# Patient Record
Sex: FEMALE | Race: WHITE | Employment: UNEMPLOYED | URBAN - NONMETROPOLITAN AREA
[De-identification: names, ages, dates, MRNs, and addresses within clinical notes are randomized per-mention and may not be internally consistent; named-entity substitution may affect disease eponyms.]

---

## 2023-09-11 ENCOUNTER — TELEPHONE (OUTPATIENT)
Dept: CARDIOLOGY CLINIC | Age: 53
End: 2023-09-11

## 2023-09-11 NOTE — TELEPHONE ENCOUNTER
Patient called stating she was in [facility] ER yesterday due to feeling flushed and having chest pain. They advised her to stop Metoprolol due to HR in 50s. She states she feels a lot better today. BP since then have been 146/66m HR-67 and -76 and HR 72. She is asking if she should stay off her Metoprolol. I called for records from [facility]. She also states her last name changed to Isabel and updated information in chart. Dr. Mary Mccann advise on Metoprolol.

## 2023-09-12 NOTE — TELEPHONE ENCOUNTER
Pt calling again-states she has been feeling much better off the Metoprolol. No more HA, chest pain or neck pain.   BP readings today  117/58 78  112/74 75  123/94 67

## 2023-09-19 RX ORDER — ATORVASTATIN CALCIUM 40 MG/1
40 TABLET, FILM COATED ORAL DAILY
Qty: 60 TABLET | Refills: 0 | Status: SHIPPED | OUTPATIENT
Start: 2023-09-19

## 2023-10-12 ENCOUNTER — OFFICE VISIT (OUTPATIENT)
Dept: CARDIOLOGY CLINIC | Age: 53
End: 2023-10-12
Payer: COMMERCIAL

## 2023-10-12 VITALS
HEIGHT: 68 IN | DIASTOLIC BLOOD PRESSURE: 81 MMHG | WEIGHT: 236 LBS | SYSTOLIC BLOOD PRESSURE: 130 MMHG | BODY MASS INDEX: 35.77 KG/M2 | HEART RATE: 73 BPM

## 2023-10-12 DIAGNOSIS — I25.10 CORONARY ARTERY DISEASE INVOLVING NATIVE CORONARY ARTERY OF NATIVE HEART WITHOUT ANGINA PECTORIS: Primary | ICD-10-CM

## 2023-10-12 PROCEDURE — 93000 ELECTROCARDIOGRAM COMPLETE: CPT | Performed by: INTERNAL MEDICINE

## 2023-10-12 PROCEDURE — 99214 OFFICE O/P EST MOD 30 MIN: CPT | Performed by: INTERNAL MEDICINE

## 2023-10-12 NOTE — PROGRESS NOTES
Pt here for 1 yr check up -has upcoming kidney mass removal with Dr. Adán Cortez  TBD       Pt lost father recently     Pt states was recently at [facility]        Pt states no c/o
AM       No results found for: \"TSH\"      Testing Reviewed:      I haveindividually reviewed the below cardiac tests    EKG:    ECHO: Results for orders placed in visit on 03/29/21    ECHO Complete 2D W Doppler W Color      STRESS:    CATH:    Assessment/Plan       Diagnosis Orders   1. Coronary artery disease involving native coronary artery of native heart without angina pectoris  EKG 12 lead        CAD s/p PCI  Hx NSTEMI s/p PCI 5/2/2018  Fibromyalgia  Obesity     EKG shows SR, no ST-T changes  BP well controlled  Continue Aspirin,  statin,   On Plavix. Stopped metoprolol for \"brain foggyness\" which has improved since stopping  May need surgery to remove renal mass  The patient is asked to make an attempt to improve diet and exercise patterns to aid in medical management of this problem. Advised more plant based nutrition/meditarrean diet   Advised patient to call office or seek immediate medical attention if there is any new onset of  any chest pain, sob, palpitations, lightheadedness, dizziness, orthopnea, PND or pedal edema. All medication side effects were discussed in details. Thank youfor allowing me to participate in the care of this patient. Please do not hesitate to contact me for any further questions. Return in about 1 year (around 10/12/2024), or if symptoms worsen or fail to improve, for Review testing, Regular follow up.        Electronically signed by Mandy Bolanos MD [facility]  10/12/2023 at 4:03 PM EDT

## 2023-10-16 ENCOUNTER — HOSPITAL ENCOUNTER (OUTPATIENT)
Dept: MRI IMAGING | Age: 53
Discharge: HOME | End: 2023-10-16
Attending: RADIOLOGY

## 2023-10-16 ENCOUNTER — HOSPITAL ENCOUNTER (OUTPATIENT)
Dept: CT IMAGING | Age: 53
Discharge: HOME | End: 2023-10-16
Attending: RADIOLOGY

## 2023-10-16 DIAGNOSIS — Z00.6 EXAMINATION FOR NORMAL COMPARISON FOR CLINICAL RESEARCH: ICD-10-CM

## 2023-10-19 ENCOUNTER — OFFICE VISIT (OUTPATIENT)
Dept: UROLOGY | Age: 53
End: 2023-10-19
Payer: COMMERCIAL

## 2023-10-19 VITALS — WEIGHT: 236 LBS | HEIGHT: 68 IN | BODY MASS INDEX: 35.77 KG/M2 | RESPIRATION RATE: 16 BRPM

## 2023-10-19 DIAGNOSIS — N28.89 RENAL MASS, RIGHT: Primary | ICD-10-CM

## 2023-10-19 PROCEDURE — 99205 OFFICE O/P NEW HI 60 MIN: CPT | Performed by: UROLOGY

## 2023-10-19 NOTE — PROGRESS NOTES
Walter Escalante MD  Urology Clinic office visit  NEW PATIENT    Patient:  Olive Ruelas  YOB: 1970  Date: 10/19/2023    HISTORY OF PRESENT ILLNESS:   The patient is a 48 y.o. female who presents today for evaluation of the following problems:      1. Renal mass, right         Overall the problem(s) : are worsening. Associated Symptoms: No dysuria, gross hematuria. Pain Severity:      Summary of old records: N/A  (Patient's old records, notes and chart reviewed and summarized above.)      Onset 9/2023  Severity is described as left renal mass, central, next to hilum  2.5-3 cm  2 arteries, 2 veins  It is right next to the lower vein branch  Incidentally found during chest pain work up  [address] [place] [address] 2018- has one stent, on plavix and asa. The course of symptoms over time is insidious. Alleviating factors: none  Worsening factors: none      Urinalysis today:  No results found for this visit on 10/19/23. Last BUN and creatinine:  Lab Results   Component Value Date    BUN 18 03/21/2021     Lab Results   Component Value Date    CREATININE 0.9 03/21/2021       Imaging Reviewed during this Office Visit:   (results were independently reviewed by physician and radiology report verified)  I independently reviewed and verified the images and reports from:    No results found.       PAST MEDICAL, FAMILY AND SOCIAL HISTORY:  Past Medical History:   Diagnosis Date    Arthritis     Bell's palsy     Cavernous angioma     Cellulitis     Fibromyalgia     GERD (gastroesophageal reflux disease)     Migraine     Pneumonia     Pulmonary embolism, blood-clot, obstetric, postpartum condition     Stomach ulcer     Unspecified diseases of blood and blood-forming organs     anemia     Past Surgical History:   Procedure Laterality Date    APPENDECTOMY      CHOLECYSTECTOMY      COLONOSCOPY      DILATION AND CURETTAGE OF UTERUS      EKG 12-LEAD  8/5/2015         ENDOSCOPY, COLON, DIAGNOSTIC      HYSTERECTOMY (CERVIX STATUS

## 2023-10-20 ENCOUNTER — TELEPHONE (OUTPATIENT)
Dept: UROLOGY | Age: 53
End: 2023-10-20

## 2023-10-20 NOTE — TELEPHONE ENCOUNTER
Last OV 10/12/23  Last Stress 5/1/18  Last Echo 3/26/21  Last Cath 5/2/18  Last Stent 5/2/18  Is patient on blood thinners ASA 81, Plavix  Hold Meds/how many days ?

## 2023-10-20 NOTE — TELEPHONE ENCOUNTER
Robotic Surgery Scheduling Form   [facility] [address], [address] [facility] [address]    Phone * [phone]   Surgical Scheduling Direct line Phone * [phone]  Fax * [phone]      Abby Beverage      1970    female    [address] [facility] [address]  Marital Status:          Home Phone: [phone]   Cell Phone:   Telephone Information:   Mobile [phone]              Surgeon: Dr. Concepción Beltran   Surgery Date:10/31/23 Time: 11:00 am     Procedure: Robotic Assisted Laparoscopic Right Radical Nephrectomy, Possible Open   Outpatient/ OBS    Diagnosis: Right Renal Mass    Important Medical History: IN EPIC    Special Inst/Equip: XI    CPT Codes: 76214    Latex Allergy:   Yes Cardiac Device:  No    Case Location:  Main OR     Preadmission Testing:  Phone Call    PAT Date and Time: ________________________________    PAT Confirmation #: _________________________________    Post Op Visit:  ______________________________________    Need Preop Cardiac Clearance:   Yes    Does patient have Cardiologist/physician?   Dr Clint Bah #:  ______________________________________________    Luis M Sanchezford: __________________________________ Date:____________________    Firefly:  No    Dual Console:  No   Ultrasound:  No    Single Site: No    RNFA (colon resection only):  Assisting Surgeon: [email or web address] Name:  [employer]

## 2023-10-20 NOTE — TELEPHONE ENCOUNTER
Patient is scheduled for surgery with  on 10/31/23. Surgery consent to be done on arrival. Dr. Rula Navarro to clear. Patient does not need any pre op testing done. Surgery instructions gone over with patient verbally in the office or mailed to the patient. Patient informed an adult over the age of 25 must be with them at the time of surgery, upon discharge and at home for 24 hours after the procedure.

## 2023-10-20 NOTE — TELEPHONE ENCOUNTER
DO NOT TAKE  FISH OIL, MOBIC, IBUPROFEN, MOTRIN-LIKE DRUGS AND ANY MULTIVITAMINS OR OVER THE COUNTER SUPPLEMENTS 14 DAYS PRIOR TO SURGERY. HOLD ASPIRIN AND PLAVIX 5 DAYS PRIOR TO SURGERY    MUST HAVE AN ADULT OVER THE AGE OF 18 WITH YOU AT THE TIME OF THE DISCHARGE AND WITH YOU AT HOME AFTER THE PROCEDURE FOR 24 HOURS        [place] P[place] 1970     Surgical Physician: Dr. Richardson Weber have been scheduled for the procedure marked below:      Surgery: Robotic Assisted Laparoscopic Right Radical Nephrectomy, Possible Open         Date: 10/31/23     Anesthesia: Anesthesiologist (General/Spinal)     Place of Service: [facility] Second Floor Same Day Surgery         Arrive to same day surgery at:  9:00 am  (Surgery time is subject to change)      INSTRUCTIONS AS MARKED BELOW:    1.  DO NOT eat or drink anything after midnight before surgery. 2.  Shower with the BECKIE-HEX Solution from the neck down on the morning of surgery. 3   Please bring a current medication list, photo ID and insurance card(s) with you  4. Okay to take Tylenol  5. Take blood pressure or heart medication as directed, if taken in the morning take with a small sip of water  6. Start the bowel prep the day before surgery on 10/29/23  7. Plan to spend the overnight at the hospital the day of surgery. 8.  The office will call you in 1-2 days after your procedure to schedule a follow up.         Date: 10/20/2023

## 2023-10-20 NOTE — TELEPHONE ENCOUNTER
Patient scheduled for a Robotic Assisted Laparoscopic Right Radical Nephrectomy, Possible Open with Dr Denver Cote on 10/31/23.  We are asking for clearance

## 2023-10-20 NOTE — TELEPHONE ENCOUNTER
[address] [facility]rology  [address], [address]  [place], [address] [place] [address]  [phone]    START BOWEL PREP ON 10/29/23    Surgery Bowel Preparation    Purchase a 4.1 oz bottle of Miralax at your pharmacy which will be in powder form; mix with 64 ounces of water or Gatorade. Follow the instructions as directed for mixing with water and drink the entire bottle, Take 4 Dulcolax tablets the afternoon before your scheduled surgery. Start drinking approximately 2:00pm.       Start a clear liquid diet (for dinner) the evening before surgery. You may have the following:   Water   Apple, grape or cranberry juice   Clear, fat free broth   Clear sodas (7-up, Sprite)   Plain gelatin (Jell-o)   Popsicles without bits of fruit or fruit pulp   Tea or coffee without milk or cream    Nothing to eat or drink after midnight before your scheduled surgery. Do a fleets enema the night prior to your surgery between 7:00pm and 8:00pm    Please contact our office at [phone] if you have any questions.

## 2023-10-23 ENCOUNTER — TELEPHONE (OUTPATIENT)
Dept: UROLOGY | Age: 53
End: 2023-10-23

## 2023-10-23 RX ORDER — MECLIZINE HYDROCHLORIDE 25 MG/1
1 TABLET ORAL PRN
COMMUNITY
Start: 2023-06-23

## 2023-10-23 RX ORDER — ALBUTEROL SULFATE 90 UG/1
AEROSOL, METERED RESPIRATORY (INHALATION) PRN
COMMUNITY
Start: 2019-11-19

## 2023-10-23 NOTE — PROGRESS NOTES
Follow all instructions given by your physician  NPO after midnight  Sips of water am of surgery with allowed medications  Bring insurance info and 's license  Wear clean comfortable , loose-fitting clothing  No jewelry or contact lenses to be worn day of surgery  No glue on dentures morning of surgery; you will be asked to remove them for surgery. Case for glasses. Shower the night before and the morning of surgery with a liquid antibacterial soap, dry with new fresh clean towel after each shower, no lotions, creams or powder. Clean sheets and pillowcase on bed night before surgery  Bring medications in original bottles  Bring CPAP/BIPAP machine if you have one ( you may be charged if one is needed in recovery room )    Our pharmacy has a Meds to [facility] program where they will deliver any new prescriptions you may have to your room before you leave. Our Pharmacy will clear it through your insurance; for example (same co pay). This enables you to take your new RX as soon as you need when you get home and avoids stop/wait delays on the way home. Please have a form of payment with you and have someone designated as your Pharmacy contact with their phone # as you may not feel well or still be under the influence of anesthesia. Please refer to the SSI-Surgical Site Infection Flyer you hopefully received in the mail-together we can prevent infections; signs and symptoms reviewed. When discharged be sure you understand how to care for your wound and that you have the Dr./office phone # to call if you have any concerns or questions about your wound.      needed at discharge and someone over 18 to stay with you for 24 hours overnight (surgery may be cancelled if you don't have this)  Report to hospitals on 2nd floor  If you would become ill prior to surgery, please call the surgeon  May have a visitor with you, we request that you limit to 2 visitors in pre-op area  Masks are recommended but not required, new

## 2023-10-23 NOTE — TELEPHONE ENCOUNTER
Patient had 2 episodes of blood tinge urine today. She denies burning, urgency, or frequency. She does get occasional pain when she has to urinate. The urine is now clear. She is scheduled for Robotic Laparoscopic Right Radical Nephrectomy, Possible Open on 10/31/2023    Advised to push fluids.

## 2023-10-24 ENCOUNTER — TELEPHONE (OUTPATIENT)
Dept: UROLOGY | Age: 53
End: 2023-10-24

## 2023-10-31 ENCOUNTER — ANESTHESIA EVENT (OUTPATIENT)
Dept: OPERATING ROOM | Age: 53
End: 2023-10-31
Payer: COMMERCIAL

## 2023-10-31 ENCOUNTER — HOSPITAL ENCOUNTER (OUTPATIENT)
Age: 53
Discharge: HOME | End: 2023-11-01
Attending: UROLOGY | Admitting: UROLOGY
Payer: COMMERCIAL

## 2023-10-31 ENCOUNTER — ANESTHESIA (OUTPATIENT)
Dept: OPERATING ROOM | Age: 53
End: 2023-10-31
Payer: COMMERCIAL

## 2023-10-31 DIAGNOSIS — N28.89 RIGHT RENAL MASS: ICD-10-CM

## 2023-10-31 LAB
ABO: NORMAL
ANION GAP SERPL CALC-SCNC: 12 MEQ/L (ref 8–16)
ANTIBODY SCREEN: NORMAL
BUN SERPL-MCNC: 11 MG/DL (ref 7–22)
CALCIUM SERPL-MCNC: 8.5 MG/DL (ref 8.5–10.5)
CHLORIDE SERPL-SCNC: 107 MEQ/L (ref 98–111)
CO2 SERPL-SCNC: 23 MEQ/L (ref 23–33)
CREAT SERPL-MCNC: 1.1 MG/DL (ref 0.4–1.2)
DEPRECATED RDW RBC AUTO: 45.6 FL (ref 35–45)
ERYTHROCYTE [DISTWIDTH] IN BLOOD BY AUTOMATED COUNT: 12.7 % (ref 11.5–14.5)
GFR SERPL CREATININE-BSD FRML MDRD: 60 ML/MIN/1.73M2
GLUCOSE SERPL-MCNC: 140 MG/DL (ref 70–108)
HCT VFR BLD AUTO: 45 % (ref 37–47)
HGB BLD-MCNC: 14.1 GM/DL (ref 12–16)
INR PPP: 0.95 (ref 0.85–1.13)
MCH RBC QN AUTO: 30.5 PG (ref 26–33)
MCHC RBC AUTO-ENTMCNC: 31.3 GM/DL (ref 32.2–35.5)
MCV RBC AUTO: 97.4 FL (ref 81–99)
PLATELET # BLD AUTO: 315 THOU/MM3 (ref 130–400)
PMV BLD AUTO: 10.4 FL (ref 9.4–12.4)
POTASSIUM SERPL-SCNC: 3.9 MEQ/L (ref 3.5–5.2)
RBC # BLD AUTO: 4.62 MILL/MM3 (ref 4.2–5.4)
RH FACTOR: NORMAL
SODIUM SERPL-SCNC: 142 MEQ/L (ref 135–145)
WBC # BLD AUTO: 14.5 THOU/MM3 (ref 4.8–10.8)

## 2023-10-31 PROCEDURE — 2500000003 HC RX 250 WO HCPCS

## 2023-10-31 PROCEDURE — 3600000009 HC SURGERY ROBOT BASE: Performed by: UROLOGY

## 2023-10-31 PROCEDURE — 2709999900 HC NON-CHARGEABLE SUPPLY: Performed by: UROLOGY

## 2023-10-31 PROCEDURE — 6360000002 HC RX W HCPCS: Performed by: UROLOGY

## 2023-10-31 PROCEDURE — 85610 PROTHROMBIN TIME: CPT

## 2023-10-31 PROCEDURE — 6360000002 HC RX W HCPCS

## 2023-10-31 PROCEDURE — 6370000000 HC RX 637 (ALT 250 FOR IP): Performed by: UROLOGY

## 2023-10-31 PROCEDURE — 6360000002 HC RX W HCPCS: Performed by: ANESTHESIOLOGY

## 2023-10-31 PROCEDURE — 86901 BLOOD TYPING SEROLOGIC RH(D): CPT

## 2023-10-31 PROCEDURE — 3600000019 HC SURGERY ROBOT ADDTL 15MIN: Performed by: UROLOGY

## 2023-10-31 PROCEDURE — 7100000010 HC PHASE II RECOVERY - FIRST 15 MIN: Performed by: UROLOGY

## 2023-10-31 PROCEDURE — 2580000003 HC RX 258: Performed by: UROLOGY

## 2023-10-31 PROCEDURE — 3700000000 HC ANESTHESIA ATTENDED CARE: Performed by: UROLOGY

## 2023-10-31 PROCEDURE — S2900 ROBOTIC SURGICAL SYSTEM: HCPCS | Performed by: UROLOGY

## 2023-10-31 PROCEDURE — 7100000001 HC PACU RECOVERY - ADDTL 15 MIN: Performed by: UROLOGY

## 2023-10-31 PROCEDURE — 2580000003 HC RX 258

## 2023-10-31 PROCEDURE — 80048 BASIC METABOLIC PNL TOTAL CA: CPT

## 2023-10-31 PROCEDURE — 85027 COMPLETE CBC AUTOMATED: CPT

## 2023-10-31 PROCEDURE — 3700000001 HC ADD 15 MINUTES (ANESTHESIA): Performed by: UROLOGY

## 2023-10-31 PROCEDURE — 36415 COLL VENOUS BLD VENIPUNCTURE: CPT

## 2023-10-31 PROCEDURE — 86900 BLOOD TYPING SEROLOGIC ABO: CPT

## 2023-10-31 PROCEDURE — 2720000010 HC SURG SUPPLY STERILE: Performed by: UROLOGY

## 2023-10-31 PROCEDURE — 7100000000 HC PACU RECOVERY - FIRST 15 MIN: Performed by: UROLOGY

## 2023-10-31 PROCEDURE — 88307 TISSUE EXAM BY PATHOLOGIST: CPT

## 2023-10-31 PROCEDURE — 7100000011 HC PHASE II RECOVERY - ADDTL 15 MIN: Performed by: UROLOGY

## 2023-10-31 PROCEDURE — 86850 RBC ANTIBODY SCREEN: CPT

## 2023-10-31 DEVICE — SEALANT TISS 10 ML FIBRIN VISTASEAL: Type: IMPLANTABLE DEVICE | Status: FUNCTIONAL

## 2023-10-31 DEVICE — CLIP INT XL YEL POLYMER HEM-O-LOK WECK: Type: IMPLANTABLE DEVICE | Status: FUNCTIONAL

## 2023-10-31 RX ORDER — ONDANSETRON 2 MG/ML
INJECTION INTRAMUSCULAR; INTRAVENOUS PRN
Status: DISCONTINUED | OUTPATIENT
Start: 2023-10-31 | End: 2023-10-31 | Stop reason: SDUPTHER

## 2023-10-31 RX ORDER — SODIUM CHLORIDE 9 MG/ML
INJECTION, SOLUTION INTRAVENOUS PRN
Status: DISCONTINUED | OUTPATIENT
Start: 2023-10-31 | End: 2023-10-31

## 2023-10-31 RX ORDER — PROPOFOL 10 MG/ML
INJECTION, EMULSION INTRAVENOUS PRN
Status: DISCONTINUED | OUTPATIENT
Start: 2023-10-31 | End: 2023-10-31 | Stop reason: SDUPTHER

## 2023-10-31 RX ORDER — ENOXAPARIN SODIUM 100 MG/ML
30 INJECTION SUBCUTANEOUS 2 TIMES DAILY
Status: DISCONTINUED | OUTPATIENT
Start: 2023-10-31 | End: 2023-11-01 | Stop reason: HOSPADM

## 2023-10-31 RX ORDER — SODIUM CHLORIDE 0.9 % (FLUSH) 0.9 %
5-40 SYRINGE (ML) INJECTION EVERY 12 HOURS SCHEDULED
Status: DISCONTINUED | OUTPATIENT
Start: 2023-10-31 | End: 2023-10-31

## 2023-10-31 RX ORDER — FENTANYL CITRATE 50 UG/ML
50 INJECTION, SOLUTION INTRAMUSCULAR; INTRAVENOUS EVERY 5 MIN PRN
Status: COMPLETED | OUTPATIENT
Start: 2023-10-31 | End: 2023-10-31

## 2023-10-31 RX ORDER — ROCURONIUM BROMIDE 10 MG/ML
INJECTION, SOLUTION INTRAVENOUS PRN
Status: DISCONTINUED | OUTPATIENT
Start: 2023-10-31 | End: 2023-10-31 | Stop reason: SDUPTHER

## 2023-10-31 RX ORDER — MEPERIDINE HYDROCHLORIDE 25 MG/ML
12.5 INJECTION INTRAMUSCULAR; INTRAVENOUS; SUBCUTANEOUS EVERY 5 MIN PRN
Status: DISCONTINUED | OUTPATIENT
Start: 2023-10-31 | End: 2023-10-31

## 2023-10-31 RX ORDER — FENTANYL CITRATE 50 UG/ML
INJECTION, SOLUTION INTRAMUSCULAR; INTRAVENOUS PRN
Status: DISCONTINUED | OUTPATIENT
Start: 2023-10-31 | End: 2023-10-31 | Stop reason: SDUPTHER

## 2023-10-31 RX ORDER — FENTANYL CITRATE 50 UG/ML
INJECTION, SOLUTION INTRAMUSCULAR; INTRAVENOUS
Status: COMPLETED
Start: 2023-10-31 | End: 2023-10-31

## 2023-10-31 RX ORDER — MIDAZOLAM HYDROCHLORIDE 1 MG/ML
INJECTION INTRAMUSCULAR; INTRAVENOUS PRN
Status: DISCONTINUED | OUTPATIENT
Start: 2023-10-31 | End: 2023-10-31 | Stop reason: SDUPTHER

## 2023-10-31 RX ORDER — SODIUM CHLORIDE 0.9 % (FLUSH) 0.9 %
5-40 SYRINGE (ML) INJECTION PRN
Status: DISCONTINUED | OUTPATIENT
Start: 2023-10-31 | End: 2023-11-01 | Stop reason: HOSPADM

## 2023-10-31 RX ORDER — LORAZEPAM 2 MG/ML
0.5 INJECTION INTRAMUSCULAR ONCE
Status: COMPLETED | OUTPATIENT
Start: 2023-10-31 | End: 2023-10-31

## 2023-10-31 RX ORDER — OXYCODONE HYDROCHLORIDE 5 MG/1
10 TABLET ORAL EVERY 4 HOURS PRN
Status: DISCONTINUED | OUTPATIENT
Start: 2023-10-31 | End: 2023-11-01 | Stop reason: HOSPADM

## 2023-10-31 RX ORDER — SODIUM CHLORIDE 9 MG/ML
INJECTION, SOLUTION INTRAVENOUS CONTINUOUS PRN
Status: DISCONTINUED | OUTPATIENT
Start: 2023-10-31 | End: 2023-10-31 | Stop reason: SDUPTHER

## 2023-10-31 RX ORDER — GLYCOPYRROLATE 1 MG/5 ML
SYRINGE (ML) INTRAVENOUS PRN
Status: DISCONTINUED | OUTPATIENT
Start: 2023-10-31 | End: 2023-10-31 | Stop reason: SDUPTHER

## 2023-10-31 RX ORDER — DEXAMETHASONE SODIUM PHOSPHATE 10 MG/ML
INJECTION, EMULSION INTRAMUSCULAR; INTRAVENOUS PRN
Status: DISCONTINUED | OUTPATIENT
Start: 2023-10-31 | End: 2023-10-31 | Stop reason: SDUPTHER

## 2023-10-31 RX ORDER — LEVOFLOXACIN 5 MG/ML
500 INJECTION, SOLUTION INTRAVENOUS
Status: COMPLETED | OUTPATIENT
Start: 2023-10-31 | End: 2023-10-31

## 2023-10-31 RX ORDER — ONDANSETRON 2 MG/ML
4 INJECTION INTRAMUSCULAR; INTRAVENOUS EVERY 6 HOURS PRN
Status: DISCONTINUED | OUTPATIENT
Start: 2023-10-31 | End: 2023-11-01 | Stop reason: HOSPADM

## 2023-10-31 RX ORDER — OXYCODONE HYDROCHLORIDE 5 MG/1
5 TABLET ORAL EVERY 4 HOURS PRN
Status: DISCONTINUED | OUTPATIENT
Start: 2023-10-31 | End: 2023-11-01 | Stop reason: HOSPADM

## 2023-10-31 RX ORDER — MORPHINE SULFATE 2 MG/ML
2 INJECTION, SOLUTION INTRAMUSCULAR; INTRAVENOUS
Status: DISCONTINUED | OUTPATIENT
Start: 2023-10-31 | End: 2023-10-31

## 2023-10-31 RX ORDER — SODIUM CHLORIDE 9 MG/ML
INJECTION, SOLUTION INTRAVENOUS CONTINUOUS
Status: DISCONTINUED | OUTPATIENT
Start: 2023-10-31 | End: 2023-11-01

## 2023-10-31 RX ORDER — SODIUM CHLORIDE 9 MG/ML
INJECTION, SOLUTION INTRAVENOUS PRN
Status: DISCONTINUED | OUTPATIENT
Start: 2023-10-31 | End: 2023-11-01 | Stop reason: HOSPADM

## 2023-10-31 RX ORDER — ONDANSETRON 2 MG/ML
4 INJECTION INTRAMUSCULAR; INTRAVENOUS
Status: DISCONTINUED | OUTPATIENT
Start: 2023-10-31 | End: 2023-10-31

## 2023-10-31 RX ORDER — BUPIVACAINE HYDROCHLORIDE 5 MG/ML
INJECTION, SOLUTION PERINEURAL PRN
Status: DISCONTINUED | OUTPATIENT
Start: 2023-10-31 | End: 2023-10-31 | Stop reason: ALTCHOICE

## 2023-10-31 RX ORDER — SODIUM CHLORIDE 0.9 % (FLUSH) 0.9 %
5-40 SYRINGE (ML) INJECTION PRN
Status: DISCONTINUED | OUTPATIENT
Start: 2023-10-31 | End: 2023-10-31

## 2023-10-31 RX ORDER — LIDOCAINE HYDROCHLORIDE 20 MG/ML
INJECTION, SOLUTION INTRAVENOUS PRN
Status: DISCONTINUED | OUTPATIENT
Start: 2023-10-31 | End: 2023-10-31 | Stop reason: SDUPTHER

## 2023-10-31 RX ORDER — ONDANSETRON 4 MG/1
4 TABLET, ORALLY DISINTEGRATING ORAL EVERY 8 HOURS PRN
Status: DISCONTINUED | OUTPATIENT
Start: 2023-10-31 | End: 2023-11-01 | Stop reason: HOSPADM

## 2023-10-31 RX ORDER — MORPHINE SULFATE 4 MG/ML
4 INJECTION, SOLUTION INTRAMUSCULAR; INTRAVENOUS
Status: DISCONTINUED | OUTPATIENT
Start: 2023-10-31 | End: 2023-10-31

## 2023-10-31 RX ORDER — SODIUM CHLORIDE 0.9 % (FLUSH) 0.9 %
5-40 SYRINGE (ML) INJECTION EVERY 12 HOURS SCHEDULED
Status: DISCONTINUED | OUTPATIENT
Start: 2023-10-31 | End: 2023-11-01 | Stop reason: HOSPADM

## 2023-10-31 RX ORDER — HYDROMORPHONE HYDROCHLORIDE 2 MG/ML
INJECTION, SOLUTION INTRAMUSCULAR; INTRAVENOUS; SUBCUTANEOUS PRN
Status: DISCONTINUED | OUTPATIENT
Start: 2023-10-31 | End: 2023-10-31 | Stop reason: SDUPTHER

## 2023-10-31 RX ADMIN — ONDANSETRON 4 MG: 2 INJECTION INTRAMUSCULAR; INTRAVENOUS at 16:37

## 2023-10-31 RX ADMIN — FENTANYL CITRATE 50 MCG: 50 INJECTION, SOLUTION INTRAMUSCULAR; INTRAVENOUS at 15:30

## 2023-10-31 RX ADMIN — SODIUM CHLORIDE: 9 INJECTION, SOLUTION INTRAVENOUS at 09:33

## 2023-10-31 RX ADMIN — ROCURONIUM BROMIDE 20 MG: 10 INJECTION INTRAVENOUS at 13:23

## 2023-10-31 RX ADMIN — HYDROMORPHONE HYDROCHLORIDE 0.5 MG: 2 INJECTION INTRAMUSCULAR; INTRAVENOUS; SUBCUTANEOUS at 14:51

## 2023-10-31 RX ADMIN — LEVOFLOXACIN 500 MG: 5 INJECTION, SOLUTION INTRAVENOUS at 12:55

## 2023-10-31 RX ADMIN — LORAZEPAM 0.5 MG: 2 INJECTION, SOLUTION INTRAMUSCULAR; INTRAVENOUS at 09:33

## 2023-10-31 RX ADMIN — SODIUM CHLORIDE: 9 INJECTION, SOLUTION INTRAVENOUS at 12:44

## 2023-10-31 RX ADMIN — DEXAMETHASONE SODIUM PHOSPHATE 8 MG: 10 INJECTION, EMULSION INTRAMUSCULAR; INTRAVENOUS at 12:58

## 2023-10-31 RX ADMIN — ONDANSETRON 4 MG: 2 INJECTION INTRAMUSCULAR; INTRAVENOUS at 14:37

## 2023-10-31 RX ADMIN — Medication 0.2 MG: at 14:21

## 2023-10-31 RX ADMIN — SODIUM CHLORIDE: 9 INJECTION, SOLUTION INTRAVENOUS at 13:35

## 2023-10-31 RX ADMIN — FENTANYL CITRATE 25 MCG: 50 INJECTION, SOLUTION INTRAMUSCULAR; INTRAVENOUS at 13:42

## 2023-10-31 RX ADMIN — FENTANYL CITRATE 50 MCG: 50 INJECTION, SOLUTION INTRAMUSCULAR; INTRAVENOUS at 15:35

## 2023-10-31 RX ADMIN — FENTANYL CITRATE 25 MCG: 50 INJECTION, SOLUTION INTRAMUSCULAR; INTRAVENOUS at 13:27

## 2023-10-31 RX ADMIN — SUGAMMADEX 400 MG: 100 INJECTION, SOLUTION INTRAVENOUS at 15:09

## 2023-10-31 RX ADMIN — HYDROMORPHONE HYDROCHLORIDE 0.5 MG: 1 INJECTION, SOLUTION INTRAMUSCULAR; INTRAVENOUS; SUBCUTANEOUS at 18:34

## 2023-10-31 RX ADMIN — PROPOFOL 150 MG: 10 INJECTION, EMULSION INTRAVENOUS at 12:48

## 2023-10-31 RX ADMIN — LIDOCAINE HYDROCHLORIDE 100 MG: 20 INJECTION, SOLUTION INTRAVENOUS at 15:20

## 2023-10-31 RX ADMIN — ENOXAPARIN SODIUM 30 MG: 100 INJECTION SUBCUTANEOUS at 21:50

## 2023-10-31 RX ADMIN — ROCURONIUM BROMIDE 50 MG: 10 INJECTION INTRAVENOUS at 12:48

## 2023-10-31 RX ADMIN — SODIUM CHLORIDE: 9 INJECTION, SOLUTION INTRAVENOUS at 18:15

## 2023-10-31 RX ADMIN — SODIUM CHLORIDE: 9 INJECTION, SOLUTION INTRAVENOUS at 14:33

## 2023-10-31 RX ADMIN — HYDROMORPHONE HYDROCHLORIDE 0.5 MG: 2 INJECTION INTRAMUSCULAR; INTRAVENOUS; SUBCUTANEOUS at 15:22

## 2023-10-31 RX ADMIN — ROCURONIUM BROMIDE 10 MG: 10 INJECTION INTRAVENOUS at 14:45

## 2023-10-31 RX ADMIN — FENTANYL CITRATE 50 MCG: 50 INJECTION, SOLUTION INTRAMUSCULAR; INTRAVENOUS at 12:48

## 2023-10-31 RX ADMIN — LIDOCAINE HYDROCHLORIDE 100 MG: 20 INJECTION, SOLUTION INTRAVENOUS at 12:48

## 2023-10-31 RX ADMIN — OXYCODONE HYDROCHLORIDE 10 MG: 5 TABLET ORAL at 16:37

## 2023-10-31 RX ADMIN — ROCURONIUM BROMIDE 10 MG: 10 INJECTION INTRAVENOUS at 14:11

## 2023-10-31 RX ADMIN — MIDAZOLAM 2 MG: 1 INJECTION INTRAMUSCULAR; INTRAVENOUS at 12:44

## 2023-10-31 RX ADMIN — HYDROMORPHONE HYDROCHLORIDE 0.5 MG: 2 INJECTION INTRAMUSCULAR; INTRAVENOUS; SUBCUTANEOUS at 14:28

## 2023-10-31 RX ADMIN — HYDROMORPHONE HYDROCHLORIDE 0.5 MG: 2 INJECTION INTRAMUSCULAR; INTRAVENOUS; SUBCUTANEOUS at 14:46

## 2023-10-31 ASSESSMENT — PAIN DESCRIPTION - LOCATION
LOCATION: ABDOMEN

## 2023-10-31 ASSESSMENT — PAIN DESCRIPTION - PAIN TYPE
TYPE: SURGICAL PAIN
TYPE: SURGICAL PAIN

## 2023-10-31 ASSESSMENT — PAIN DESCRIPTION - ORIENTATION
ORIENTATION: RIGHT

## 2023-10-31 ASSESSMENT — PAIN DESCRIPTION - DESCRIPTORS
DESCRIPTORS: ACHING

## 2023-10-31 ASSESSMENT — PAIN - FUNCTIONAL ASSESSMENT
PAIN_FUNCTIONAL_ASSESSMENT: PREVENTS OR INTERFERES SOME ACTIVE ACTIVITIES AND ADLS
PAIN_FUNCTIONAL_ASSESSMENT: PREVENTS OR INTERFERES SOME ACTIVE ACTIVITIES AND ADLS

## 2023-10-31 ASSESSMENT — PAIN SCALES - GENERAL
PAINLEVEL_OUTOF10: 0
PAINLEVEL_OUTOF10: 8
PAINLEVEL_OUTOF10: 8
PAINLEVEL_OUTOF10: 6
PAINLEVEL_OUTOF10: 10
PAINLEVEL_OUTOF10: 5
PAINLEVEL_OUTOF10: 7
PAINLEVEL_OUTOF10: 7
PAINLEVEL_OUTOF10: 10

## 2023-10-31 ASSESSMENT — PAIN DESCRIPTION - ONSET: ONSET: ON-GOING

## 2023-10-31 ASSESSMENT — LIFESTYLE VARIABLES: SMOKING_STATUS: 1

## 2023-10-31 ASSESSMENT — PAIN DESCRIPTION - FREQUENCY: FREQUENCY: CONTINUOUS

## 2023-10-31 NOTE — PROGRESS NOTES
Patient admitted to [facility] room 20 with family at bedside. Bed in low position side rails up call light in reach. Patient denies questions at this time.

## 2023-10-31 NOTE — H&P
History and Physical    Patient:  Venita Alfaro  MRN: [id number]  YOB: 1970    CHIEF COMPLAINT:  right renal mass    HISTORY OF PRESENT ILLNESS:   The patient is a 48 y.o. female who presents with as above, here for surgery    Patient's old records, notes and chart reviewed and summarized above. Past Medical History:    Past Medical History:   Diagnosis Date    Arthritis     Bell's palsy     Cavernous angioma     Cellulitis     Fibromyalgia     GERD (gastroesophageal reflux disease)     Migraine     Pneumonia     Pulmonary embolism, blood-clot, obstetric, postpartum condition     Stomach ulcer     Unspecified diseases of blood and blood-forming organs     anemia       Past Surgical History:    Past Surgical History:   Procedure Laterality Date    APPENDECTOMY      CHOLECYSTECTOMY      COLONOSCOPY      DILATION AND CURETTAGE OF UTERUS      EKG 12-LEAD  8/5/2015         ENDOSCOPY, COLON, DIAGNOSTIC      HYSTERECTOMY (CERVIX STATUS UNKNOWN)      MASTECTOMY, PARTIAL Left 11/2019    PTCA  05/02/2018    Proximal LAD    WRIST GANGLION EXCISION Left      Medications Prior to Admission:    Prior to Admission medications    Medication Sig Start Date End Date Taking? Authorizing Provider   meclizine (ANTIVERT) 25 MG tablet Take 1 tablet by mouth as needed 6/23/23  Yes Provider, MD Andreas   albuterol sulfate HFA (PROVENTIL;VENTOLIN;PROAIR) 108 (90 Base) MCG/ACT inhaler Inhale into the lungs as needed 11/19/19  Yes Provider, Historical, MD   atorvastatin (LIPITOR) 40 MG tablet Take 1 tablet by mouth daily 9/19/23   Colby Smith MD   clopidogrel (PLAVIX) 75 MG tablet Take 1 tablet by mouth daily 5/23/23   CLARI Krishna CNP   aspirin (ASPIRIN LOW DOSE) 81 MG EC tablet take 1 tablet by mouth once daily 9/19/22   Shannan Garcia APRN - CNP   nitroGLYCERIN (NITROSTAT) 0.4 MG SL tablet up to max of 3 total doses. If no relief after 1 dose, call 911.   Patient not taking: Reported on 10/31/2023 7/27/22   Rivka Leblanc MD   pantoprazole (PROTONIX) 20 MG tablet Take 1 tablet by mouth daily    ProviderAndreas MD       Allergies:  Latex, Augmentin [amoxicillin-pot clavulanate], Guaifenesin, Metoprolol, Erythromycin, Hydrocodone-acetaminophen, Loratadine, Nicotine, Claritin [loratadine], Clindamycin, Cortisone, Morphine, and Other    Social History:    Social History     Socioeconomic History    Marital status:      Spouse name: Not on file    Number of children: 3    Years of education: Not on file    Highest education level: Not on file   Occupational History    Not on file   Tobacco Use    Smoking status: Former     Packs/day: 1.00     Years: 20.00     Additional pack years: 0.00     Total pack years: 20.00     Types: Cigarettes     Quit date: 2018     Years since quittin.5    Smokeless tobacco: Never   Vaping Use    Vaping Use: Never used   Substance and Sexual Activity    Alcohol use: No    Drug use: No    Sexual activity: Not on file   Other Topics Concern    Not on file   Social History Narrative    Not on file     Social Determinants of Health     Financial Resource Strain: Not on file   Food Insecurity: Not on file   Transportation Needs: Not on file   Physical Activity: Not on file   Stress: Not on file   Social Connections: Not on file   Intimate Partner Violence: Not on file   Housing Stability: Not on file       Family History:    Family History   Problem Relation Age of Onset    Heart Disease Mother 46        cabg, pacemaker    Other Mother         carotid endart    Pacemaker Mother     High Blood Pressure Mother     High Cholesterol Mother     Heart Disease Father     High Blood Pressure Father     Cancer Father     Diabetes Father     High Blood Pressure Brother        REVIEW OF SYSTEMS:  Constitutional: negative  Eyes: negative  Respiratory: negative  Cardiovascular: negative  Gastrointestinal: negative  Genitourinary: see HPI  Musculoskeletal:

## 2023-10-31 NOTE — PROGRESS NOTES
1523- pt to pacu, resp easy and unlabored, VSS, pt appears in no acute distress, pt medicated for pain per CRNA  1530- fentanyl given  1535- fentanyl given, pt tolerating  1540- pt asleep in bed, resp easy and unlabored, VSS, pt states \"it hurts but its better\", pt falling asleep mid sentence, pt appears in no acute distress  1555- pt meets criteria for discharge from pacu, pt transported to [facility] in stable condition

## 2023-10-31 NOTE — PROGRESS NOTES
1658: report called to [facility] RN on [address] [place] [address]. Pt placed for transport. 1740: pt transported to [address] [place] [address] in stable condition.

## 2023-10-31 NOTE — ANESTHESIA POSTPROCEDURE EVALUATION
Department of Anesthesiology  Postprocedure Note    Patient: Xiang Obregon  MRN: [id number]  YOB: 1970  Date of evaluation: 10/31/2023      Procedure Summary     Date: 10/31/23 Room / Location: [facility] 08 / Brayton Koyanagi    Anesthesia Start: 1244 Anesthesia Stop: 3916    Procedure: Robotic Laparoscopic Right Radical Nephrectomy, (Right) Diagnosis:       Right renal mass      (Right renal mass [N28.89])    Surgeons: Darshan Herrera MD Responsible Provider: Theron Dukes MD    Anesthesia Type: general ASA Status: 3          Anesthesia Type: No value filed.     Ed Phase I: Ed Score: 8    Ed Phase II:        Anesthesia Post Evaluation

## 2023-10-31 NOTE — DISCHARGE INSTRUCTIONS
General Discharge Instructions:      No heavy lifting, >20 lbs for 4-6 week or till cleared by surgeon  Pt should avoid strenuous activity for 4-6 weeks  Pt should walk moderately at home  Pt ok to shower in 24 hrs after discharge while keeping incision clean / dry. Pt may resume diet as tolerated  Pt should take Rx as directed  No driving while on narcotics  Please call attending physician or hospital  with questions  Call or Present to ED if fever (> 101F), intractable nausea vomiting or pain, if incisions become red/swollen or drain pus/fluid, or if calves become red swollen and tender.   Follow up will be arranged  Resume blood thinners in *** days

## 2023-11-01 ENCOUNTER — TELEPHONE (OUTPATIENT)
Dept: UROLOGY | Age: 53
End: 2023-11-01

## 2023-11-01 VITALS
DIASTOLIC BLOOD PRESSURE: 72 MMHG | BODY MASS INDEX: 34.43 KG/M2 | HEART RATE: 72 BPM | RESPIRATION RATE: 18 BRPM | WEIGHT: 227.2 LBS | TEMPERATURE: 98.4 F | HEIGHT: 68 IN | SYSTOLIC BLOOD PRESSURE: 124 MMHG | OXYGEN SATURATION: 94 %

## 2023-11-01 LAB
ANION GAP SERPL CALC-SCNC: 11 MEQ/L (ref 8–16)
BUN SERPL-MCNC: 12 MG/DL (ref 7–22)
CALCIUM SERPL-MCNC: 8.7 MG/DL (ref 8.5–10.5)
CHLORIDE SERPL-SCNC: 107 MEQ/L (ref 98–111)
CO2 SERPL-SCNC: 21 MEQ/L (ref 23–33)
CREAT SERPL-MCNC: 1.2 MG/DL (ref 0.4–1.2)
DEPRECATED RDW RBC AUTO: 45 FL (ref 35–45)
ERYTHROCYTE [DISTWIDTH] IN BLOOD BY AUTOMATED COUNT: 12.6 % (ref 11.5–14.5)
GFR SERPL CREATININE-BSD FRML MDRD: 54 ML/MIN/1.73M2
GLUCOSE SERPL-MCNC: 142 MG/DL (ref 70–108)
HCT VFR BLD AUTO: 44.1 % (ref 37–47)
HGB BLD-MCNC: 13.7 GM/DL (ref 12–16)
MCH RBC QN AUTO: 30 PG (ref 26–33)
MCHC RBC AUTO-ENTMCNC: 31.1 GM/DL (ref 32.2–35.5)
MCV RBC AUTO: 96.7 FL (ref 81–99)
PLATELET # BLD AUTO: 342 THOU/MM3 (ref 130–400)
PMV BLD AUTO: 10.5 FL (ref 9.4–12.4)
POTASSIUM SERPL-SCNC: 4.6 MEQ/L (ref 3.5–5.2)
RBC # BLD AUTO: 4.56 MILL/MM3 (ref 4.2–5.4)
SODIUM SERPL-SCNC: 139 MEQ/L (ref 135–145)
WBC # BLD AUTO: 11.7 THOU/MM3 (ref 4.8–10.8)

## 2023-11-01 PROCEDURE — 6360000002 HC RX W HCPCS: Performed by: UROLOGY

## 2023-11-01 PROCEDURE — 85027 COMPLETE CBC AUTOMATED: CPT

## 2023-11-01 PROCEDURE — 6370000000 HC RX 637 (ALT 250 FOR IP): Performed by: UROLOGY

## 2023-11-01 PROCEDURE — APPNB30 APP NON BILLABLE TIME 0-30 MINS: Performed by: UROLOGY

## 2023-11-01 PROCEDURE — 80048 BASIC METABOLIC PNL TOTAL CA: CPT

## 2023-11-01 PROCEDURE — 2580000003 HC RX 258: Performed by: UROLOGY

## 2023-11-01 PROCEDURE — 36415 COLL VENOUS BLD VENIPUNCTURE: CPT

## 2023-11-01 RX ORDER — OXYCODONE HYDROCHLORIDE 5 MG/1
5 TABLET ORAL EVERY 4 HOURS PRN
Qty: 20 TABLET | Refills: 0 | Status: SHIPPED | OUTPATIENT
Start: 2023-11-01 | End: 2023-11-06

## 2023-11-01 RX ADMIN — ENOXAPARIN SODIUM 30 MG: 100 INJECTION SUBCUTANEOUS at 09:06

## 2023-11-01 RX ADMIN — HYDROMORPHONE HYDROCHLORIDE 0.25 MG: 1 INJECTION, SOLUTION INTRAMUSCULAR; INTRAVENOUS; SUBCUTANEOUS at 01:56

## 2023-11-01 RX ADMIN — HYDROMORPHONE HYDROCHLORIDE 0.25 MG: 1 INJECTION, SOLUTION INTRAMUSCULAR; INTRAVENOUS; SUBCUTANEOUS at 05:50

## 2023-11-01 RX ADMIN — ONDANSETRON 4 MG: 2 INJECTION INTRAMUSCULAR; INTRAVENOUS at 08:52

## 2023-11-01 RX ADMIN — ONDANSETRON 4 MG: 2 INJECTION INTRAMUSCULAR; INTRAVENOUS at 01:43

## 2023-11-01 RX ADMIN — OXYCODONE HYDROCHLORIDE 5 MG: 5 TABLET ORAL at 10:42

## 2023-11-01 RX ADMIN — ONDANSETRON 4 MG: 2 INJECTION INTRAMUSCULAR; INTRAVENOUS at 16:28

## 2023-11-01 RX ADMIN — SODIUM CHLORIDE: 9 INJECTION, SOLUTION INTRAVENOUS at 01:48

## 2023-11-01 RX ADMIN — SODIUM CHLORIDE, PRESERVATIVE FREE 10 ML: 5 INJECTION INTRAVENOUS at 08:48

## 2023-11-01 ASSESSMENT — PAIN DESCRIPTION - DESCRIPTORS
DESCRIPTORS: ACHING

## 2023-11-01 ASSESSMENT — PAIN DESCRIPTION - LOCATION
LOCATION: ABDOMEN
LOCATION: ABDOMEN
LOCATION: ABDOMEN;SHOULDER
LOCATION: SHOULDER
LOCATION: SHOULDER

## 2023-11-01 ASSESSMENT — PAIN DESCRIPTION - ORIENTATION
ORIENTATION: RIGHT;MID
ORIENTATION: RIGHT

## 2023-11-01 ASSESSMENT — PAIN SCALES - GENERAL
PAINLEVEL_OUTOF10: 4
PAINLEVEL_OUTOF10: 5
PAINLEVEL_OUTOF10: 5
PAINLEVEL_OUTOF10: 3
PAINLEVEL_OUTOF10: 6
PAINLEVEL_OUTOF10: 6

## 2023-11-01 ASSESSMENT — PAIN DESCRIPTION - FREQUENCY: FREQUENCY: CONTINUOUS

## 2023-11-01 NOTE — PROGRESS NOTES
Removed saldana catheter patient tolerated well. Ten mL of clear fluid removed from balloon. Educated patient about expected pain and some bleeding with urination also if she does not urinate in 6-8 hours we may have to reassess if a saldana is needed.

## 2023-11-01 NOTE — PROGRESS NOTES
Gave report to primary nurse. Left patient sitting up in chair. Call light in place. Two family members in room with her. Pt voiced no concerns only that she had some mild pain in right shoulder.  Nurse was notified------------------------ Red

## 2023-11-01 NOTE — PROGRESS NOTES
Discharge instructions reviewed with patient. All questions answered. Patient verbalized understanding.  at bedside. Copies of AVS reviewed. IV discontinued with tip intact. All personal belongings gathered. Patient leaves instable condition via wheelchair to family car.

## 2023-11-01 NOTE — TELEPHONE ENCOUNTER
R nephrectomy by [facility]  Needs fu in the upcoming wks for path, post op check

## 2023-11-01 NOTE — PLAN OF CARE
Problem: Pain  Goal: Verbalizes/displays adequate comfort level or baseline comfort level  Outcome: Progressing  Flowsheets (Taken 10/31/2023 1945)  Verbalizes/displays adequate comfort level or baseline comfort level:   Encourage patient to monitor pain and request assistance   Assess pain using appropriate pain scale   Administer analgesics based on type and severity of pain and evaluate response   Implement non-pharmacological measures as appropriate and evaluate response    Problem: Safety - Adult  Goal: Free from fall injury  Outcome: Progressing  Fall assessment completed. Patient using call light appropriately to call for assistance with ambulation to bathroom. Personal items within reach. Patient is also compliant with use of non-skid slippers.     Placed bed in low position  Bed alarm turned on    Problem: Respiratory - Adult  Goal: Achieves optimal ventilation and oxygenation  Recent Flowsheet Documentation  Taken 10/31/2023 2257 by Sandeep Salgado RN  Achieves optimal ventilation and oxygenation:   Assess for changes in respiratory status   Assess for changes in mentation and behavior   Position to facilitate oxygenation and minimize respiratory effort   Oxygen supplementation based on oxygen saturation or arterial blood gases   Assess and instruct to report shortness of breath or any respiratory difficulty    Problem: Skin/Tissue Integrity - Adult  Goal: Incisions, wounds, or drain sites healing without S/S of infection  Recent Flowsheet Documentation  Taken 10/31/2023 2257 by Sandeep Salgado RN  Incisions, Wounds, or Drain Sites Healing Without Sign and Symptoms of Infection:   ADMISSION and DAILY: Assess and document risk factors for pressure ulcer development   TWICE DAILY: Assess and document skin integrity   TWICE DAILY: Assess and document dressing/incision, wound bed, drain sites and surrounding tissue   Implement wound care per orders    Problem: Gastrointestinal - Adult  Goal: Minimal or

## 2023-11-02 ENCOUNTER — TELEPHONE (OUTPATIENT)
Dept: UROLOGY | Age: 53
End: 2023-11-02

## 2023-11-02 NOTE — OP NOTE
Operative Note      Patient: Beba Adame  YOB: 1970  MRN: [id number]    Date of Procedure: 10/31/2023    Pre-Op Diagnosis Codes:     * Right renal mass [N28.89]    Post-Op Diagnosis: Same       Procedure(s):  Robotic Laparoscopic Right Radical Nephrectomy,    Surgeon(s):  Breanna Guzmán MD    Assistant:   * No surgical staff found *    Anesthesia: General    Estimated Blood Loss (mL): Minimal    Complications: None    Specimens:   ID Type Source Tests Collected by Time Destination   A : Right Kidney and  Renal Mass Tissue Kidney SURGICAL PATHOLOGY Breanna Guzmán MD 10/31/2023 1456        Implants:  Implant Name Type Inv. Item Serial No.  Lot No. LRB No. Used Action   CLIP INT XL YEL POLYMER HEM-O-GURJIT [facility] - [id number]  CLIP INT XL YEL POLYMER HEM-O-GURJIT [facility] [id number] Right 1 Implanted   SEALANT TISS 10 ML [employer] - [id number]  SEALANT TISS 10 ML FIBRIN VISTASEAL  [facility]  Right 1 Implanted         Drains: * No LDAs found *    Findings: right renal mass        Detailed Description of Procedure: All treatment options were discussed. Risks, benefits, goals, alternatives, possible complications were discussed with patient. Patient elected for above mentioned procedures. Consent was signed. Patient elected to proceed. Details: Patient was brought back to the operating room. Laid in the supine position. EPC cuffs were placed on and functioning prior to anesthesia. Antibiotics were given. Puentes catheter was placed. General anesthesia was inducted. Patient was then placed inleft lateral position. Pressure points were padded. Axillary roll was placed. Patient was then prepped, draped in usual sterile standard fashion. Veress needle was used to obtain pneumo-peritoneum successfully. We entered the intra-abdominal cavity under direct visualization. Robotic ports were placed under direct visualizations. The Robot was docked.  The right white line of [place]

## 2023-11-08 ENCOUNTER — SCHEDULED TELEPHONE ENCOUNTER (OUTPATIENT)
Dept: UROLOGY | Age: 53
End: 2023-11-08

## 2023-11-08 DIAGNOSIS — N28.89 RENAL MASS, RIGHT: Primary | ICD-10-CM

## 2023-11-08 DIAGNOSIS — C64.1 RENAL CELL CARCINOMA OF RIGHT KIDNEY (HCC): ICD-10-CM

## 2023-11-08 PROCEDURE — 99024 POSTOP FOLLOW-UP VISIT: CPT | Performed by: UROLOGY

## 2023-11-08 NOTE — PROGRESS NOTES
Yudith Monreal MD  Urology Clinic office visit  NEW PATIENT    Patient:  Felicita Fan  YOB: 1970  Date: 11/8/2023    HISTORY OF PRESENT ILLNESS:   The patient is a 48 y.o. female who presents today for evaluation of the following problems:      1. Renal mass, right    2. Renal cell carcinoma of right kidney (HCC)         Overall the problem(s) : are worsening. Associated Symptoms: No dysuria, gross hematuria. Pain Severity:      Summary of old records: N/A  (Patient's old records, notes and chart reviewed and summarized above.)      Onset 9/2023  Severity is described as left renal mass, central, next to hilum  2.5-3 cm  2 arteries, 2 veins  It is right next to the lower vein branch  Incidentally found during chest pain work up  [address] [place] [address] 2018- has one stent, on plavix and asa. The course of symptoms over time is insidious. Alleviating factors: none  Worsening factors: none      Urinalysis today:  No results found for this visit on 11/08/23. Last BUN and creatinine:  Lab Results   Component Value Date    BUN 12 11/01/2023     Lab Results   Component Value Date    CREATININE 1.2 11/01/2023       Imaging Reviewed during this Office Visit:   (results were independently reviewed by physician and radiology report verified)  I independently reviewed and verified the images and reports from:    No results found.       PAST MEDICAL, FAMILY AND SOCIAL HISTORY:  Past Medical History:   Diagnosis Date    Arthritis     Bell's palsy     Cavernous angioma     Cellulitis     Fibromyalgia     GERD (gastroesophageal reflux disease)     Migraine     Pneumonia     Pulmonary embolism, blood-clot, obstetric, postpartum condition     Stomach ulcer     Unspecified diseases of blood and blood-forming organs     anemia     Past Surgical History:   Procedure Laterality Date    APPENDECTOMY      CHOLECYSTECTOMY      COLONOSCOPY      DILATION AND CURETTAGE OF UTERUS      EKG 12-LEAD  8/5/2015         ENDOSCOPY, COLON,

## 2023-11-13 RX ORDER — CLOPIDOGREL BISULFATE 75 MG/1
75 TABLET ORAL DAILY
Qty: 90 TABLET | Refills: 2 | Status: SHIPPED | OUTPATIENT
Start: 2023-11-13

## 2023-11-22 ENCOUNTER — TELEPHONE (OUTPATIENT)
Dept: UROLOGY | Age: 53
End: 2023-11-22

## 2023-11-22 NOTE — TELEPHONE ENCOUNTER
Patient states she has a burning sensation on the inside of the abdomen. The surgical site looks fine. Denies redness and the skin is not hot to touch. She vomited after surgery and felt something pull. That is where she feels the burning sensation. Could this be a nerve pain? She denies fever, chills, or problems with urination. She recently had labs completed. She has a follow up on 02/08/2023. She will go to the ER if she develops any signs of infection or develops uncontrolled pain. She feels fine otherwise. Robotic Laparoscopic Right Radical Nephrectomy on 10/31/2023.

## 2024-01-08 ENCOUNTER — TELEPHONE (OUTPATIENT)
Dept: UROLOGY | Age: 54
End: 2024-01-08

## 2024-01-08 DIAGNOSIS — R10.9 RIGHT FLANK PAIN: Primary | ICD-10-CM

## 2024-01-08 NOTE — TELEPHONE ENCOUNTER
Patient is S/P Robotic Laparoscopic Right Radical Nephrectomy by Dr. Reid on 10/31/23. Presented to [facility] yesterday (1/7/24) for evaluation of right flank pain. Normal post-op changes identified on imaging. Requested follow-up within the week to discuss. Patient needs scheduled this week with myself or another provider to evaluate.

## 2024-01-09 ENCOUNTER — TELEPHONE (OUTPATIENT)
Dept: UROLOGY | Age: 54
End: 2024-01-09

## 2024-01-09 ENCOUNTER — OFFICE VISIT (OUTPATIENT)
Dept: UROLOGY | Age: 54
End: 2024-01-09
Payer: COMMERCIAL

## 2024-01-09 VITALS — WEIGHT: 220.3 LBS | TEMPERATURE: 97 F | HEIGHT: 68 IN | BODY MASS INDEX: 33.39 KG/M2

## 2024-01-09 DIAGNOSIS — R10.31 RIGHT LOWER QUADRANT PAIN: ICD-10-CM

## 2024-01-09 DIAGNOSIS — C64.1 RENAL CELL CARCINOMA OF RIGHT KIDNEY (HCC): Primary | ICD-10-CM

## 2024-01-09 LAB
BILIRUBIN URINE: NEGATIVE
BLOOD URINE, POC: ABNORMAL
CHARACTER, URINE: CLEAR
COLOR, URINE: YELLOW
GLUCOSE URINE: NEGATIVE MG/DL
KETONES, URINE: NEGATIVE
LEUKOCYTE CLUMPS, URINE: ABNORMAL
NITRITE, URINE: NEGATIVE
PH, URINE: 5.5 (ref 5–9)
PROTEIN, URINE: NEGATIVE MG/DL
SPECIFIC GRAVITY, URINE: <= 1.005 (ref 1–1.03)
UROBILINOGEN, URINE: 0.2 EU/DL (ref 0–1)

## 2024-01-09 PROCEDURE — 99213 OFFICE O/P EST LOW 20 MIN: CPT

## 2024-01-09 RX ORDER — FAMOTIDINE 20 MG/1
20 TABLET, FILM COATED ORAL DAILY
COMMUNITY

## 2024-01-09 RX ORDER — CEPHALEXIN 500 MG/1
500 CAPSULE ORAL 2 TIMES DAILY
Qty: 14 CAPSULE | Refills: 0 | Status: SHIPPED | OUTPATIENT
Start: 2024-01-09 | End: 2024-01-16

## 2024-01-09 NOTE — TELEPHONE ENCOUNTER
Is it ok to exercise on the treadmill, elliptical, bike, and abd lounger?    Can she also take 2 tablets of the pepcid when she has bad GERD?

## 2024-01-09 NOTE — PROGRESS NOTES
[facility] [place] [facility]  [facility] UROLOGY  [address]  [address]  [facility] [address]  Dept: [phone]  Loc: [phone]  Visit Date: 1/9/2024    ARMIDA Bullock is a 53 y.o. female that presents to the urology clinic for ED right flank pain follow-up.    S/P Robotic Laparoscopic Right Radical Nephrectomy. Doing well post-operatively outside of isolated instance of RLQ/Flank pain. 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    No dysuria. Suprapubic pain and urgency. Mixed haresh on urine culture.    Pain Scale 3/10    UA: Small Blood, Small Leukocytes.  Lab Results   Component Value Date/Time    APPEARANCE Cloudy 11/20/2023 03:18 PM    COLORU Yellow 01/09/2024 10:37 AM    COLORU Yellow 11/20/2023 03:18 PM    LABSPEC <= 1.005 01/09/2024 10:37 AM    LABPH 5.50 01/09/2024 10:37 AM    NITRU Negative 01/09/2024 10:37 AM    GLUCOSEU Negative 01/09/2024 10:37 AM    KETUA Negative 01/09/2024 10:37 AM    UROBILINOGEN 0.20 01/09/2024 10:37 AM    BILIRUBINUR Negative 01/09/2024 10:37 AM        Pathology Report (10/31/23)    Right Kidney, Nephrectomy    Renal cell carcinoma, clear-cell type.     Histologic grade: G2.     Tumor size: 2 cm.     Tumor extent: Limited to the kidney.     Margins: Negative for malignancy.     Pathologic stage: pT1a.     Last BUN and creatinine:  Lab Results   Component Value Date    BUN 18 01/07/2024     Lab Results   Component Value Date    CREATININE 1.3 01/07/2024           PAST MEDICAL, FAMILY AND SOCIAL HISTORY UPDATE:  Past Medical History:   Diagnosis Date    Arthritis     Bell's palsy     Cavernous angioma     Cellulitis     Fibromyalgia     GERD (gastroesophageal reflux disease)     Migraine     Pneumonia     Pulmonary

## 2024-01-09 NOTE — TELEPHONE ENCOUNTER
I called her yesterday she is coming in today at 9 am. She was an er fu from . I slide over to your schedule from henrique. Thank you.

## 2024-01-10 NOTE — TELEPHONE ENCOUNTER
Patient advised of the message and voiced understanding. She will address the pepcid with nephrology.

## 2024-01-18 NOTE — TELEPHONE ENCOUNTER
Carolyn Bullock called requesting a refill on the following medications:  Requested Prescriptions     Pending Prescriptions Disp Refills    atorvastatin (LIPITOR) 40 MG tablet 60 tablet 0     Sig: Take 1 tablet by mouth daily     Pharmacy verified: [facility] in [place]  .pv    PT IS OUT OF MEDICATION AND CAN SHE HAVE REFILLS UNTIL HER NEXT APPT?    Date of last visit: 10/12/2023  Date of next visit (if applicable): 10/17/2024

## 2024-01-19 RX ORDER — ATORVASTATIN CALCIUM 40 MG/1
40 TABLET, FILM COATED ORAL DAILY
Qty: 90 TABLET | Refills: 2 | Status: SHIPPED | OUTPATIENT
Start: 2024-01-19

## 2024-01-30 ENCOUNTER — TELEPHONE (OUTPATIENT)
Dept: UROLOGY | Age: 54
End: 2024-01-30

## 2024-01-30 DIAGNOSIS — C64.1 RENAL CELL CARCINOMA OF RIGHT KIDNEY (HCC): Primary | ICD-10-CM

## 2024-01-30 NOTE — TELEPHONE ENCOUNTER
Patient scheduled for US RENAL COMP  at [facility] on 4/5/2024.  Arrival of 1030AM for a 1045AM scan time.  EAT A FAT FREE MEAL THE NIGHT BEFORE; NPO 6 HOURS PRIOR EXCEPT DRINK 32 OUNCES OF WATER 1 HOUR PRIOR TO SCAN AND DO NOT VOID UNTIL AFTER SCAN. Order mailed with instructions to the patient

## 2024-04-11 ENCOUNTER — OFFICE VISIT (OUTPATIENT)
Dept: UROLOGY | Age: 54
End: 2024-04-11
Payer: COMMERCIAL

## 2024-04-11 VITALS — BODY MASS INDEX: 33.34 KG/M2 | WEIGHT: 220 LBS | HEIGHT: 68 IN | RESPIRATION RATE: 14 BRPM

## 2024-04-11 DIAGNOSIS — R10.9 RIGHT FLANK PAIN: ICD-10-CM

## 2024-04-11 DIAGNOSIS — C64.1 RENAL CELL CARCINOMA OF RIGHT KIDNEY (HCC): Primary | ICD-10-CM

## 2024-04-11 LAB
BILIRUBIN URINE: NEGATIVE
BLOOD URINE, POC: ABNORMAL
CHARACTER, URINE: CLEAR
COLOR, URINE: YELLOW
GLUCOSE URINE: NEGATIVE MG/DL
KETONES, URINE: NEGATIVE
LEUKOCYTE CLUMPS, URINE: ABNORMAL
NITRITE, URINE: NEGATIVE
PH, URINE: 5.5 (ref 5–9)
PROTEIN, URINE: NEGATIVE MG/DL
SPECIFIC GRAVITY, URINE: 1.02 (ref 1–1.03)
UROBILINOGEN, URINE: 0.2 EU/DL (ref 0–1)

## 2024-04-11 PROCEDURE — 81003 URINALYSIS AUTO W/O SCOPE: CPT

## 2024-04-11 PROCEDURE — 99213 OFFICE O/P EST LOW 20 MIN: CPT

## 2024-04-11 NOTE — PROGRESS NOTES
[facility] [place] [facility]  [facility] UROLOGY  [address]  [address]  [facility] [address]  Dept: [phone]  Loc: [phone]  Visit Date: 1/9/2024    ARMIDA Bullock is a 53 y.o. female that presents to the urology clinic for ED right flank pain follow-up.    S/P Robotic Laparoscopic Right Radical Nephrectomy on 10/31/23 by Dr. Reid. Doing well post-operatively outside of isolated instance of RLQ/Flank pain following surgery- still present but coinciding with activity (MSK). Surveillance imaging negative.    No additional URO complaints.      UA: Small Blood, Small Leukocytes.  Lab Results   Component Value Date/Time    APPEARANCE Cloudy 11/20/2023 03:18 PM    COLORU Yellow 01/09/2024 10:37 AM    COLORU Yellow 11/20/2023 03:18 PM    LABSPEC <= 1.005 01/09/2024 10:37 AM    LABPH 5.50 01/09/2024 10:37 AM    NITRU Negative 01/09/2024 10:37 AM    GLUCOSEU Negative 01/09/2024 10:37 AM    KETUA Negative 01/09/2024 10:37 AM    UROBILINOGEN 0.20 01/09/2024 10:37 AM    BILIRUBINUR Negative 01/09/2024 10:37 AM        Pathology Report (10/31/23)    Right Kidney, Nephrectomy    Renal cell carcinoma, clear-cell type.     Histologic grade: G2.     Tumor size: 2 cm.     Tumor extent: Limited to the kidney.     Margins: Negative for malignancy.     Pathologic stage: pT1a.     Last BUN and creatinine:  Lab Results   Component Value Date    BUN 18 01/07/2024     Lab Results   Component Value Date    CREATININE 1.3 01/07/2024           PAST MEDICAL, FAMILY AND SOCIAL HISTORY UPDATE:  Past Medical History:   Diagnosis Date    Arthritis     Bell's palsy     Cavernous angioma     Cellulitis     Fibromyalgia     GERD (gastroesophageal reflux disease)     Migraine     Pneumonia     Pulmonary embolism, blood-clot, obstetric, postpartum condition     Stomach ulcer     Unspecified diseases of blood and blood-forming organs     anemia     Past Surgical History:   Procedure Laterality Date

## 2024-06-15 NOTE — TELEPHONE ENCOUNTER
"Blood pressure elevated in the 190s  BP (!) 143/104   Pulse 89   Temp 98.3 °F (36.8 °C)   Resp 19   Ht 5' 11\" (1.803 m)   Wt 119 kg (262 lb 5.6 oz)   SpO2 90%   BMI 36.59 kg/m²     Plan:  Continue PTA doxazosin and lisinopril  Change Norvasc to nifedipine 60 mg daily  Change hydrochlorothiazide to Aldactone 25 mg daily  Hydralazine 10 mg IV PRN for SBP > 160  " No pre cert for CPT 48288 per the website

## 2024-06-21 ENCOUNTER — TELEPHONE (OUTPATIENT)
Dept: UROLOGY | Age: 54
End: 2024-06-21

## 2024-06-21 RX ORDER — ATORVASTATIN CALCIUM 40 MG/1
40 TABLET, FILM COATED ORAL DAILY
Qty: 90 TABLET | Refills: 2 | Status: SHIPPED | OUTPATIENT
Start: 2024-06-21

## 2024-06-21 RX ORDER — CLOPIDOGREL BISULFATE 75 MG/1
75 TABLET ORAL DAILY
Qty: 90 TABLET | Refills: 2 | Status: SHIPPED | OUTPATIENT
Start: 2024-06-21

## 2024-06-21 NOTE — TELEPHONE ENCOUNTER
Carolyn Bullock called requesting a refill on the following medications:  Requested Prescriptions     Pending Prescriptions Disp Refills    atorvastatin (LIPITOR) 40 MG tablet 90 tablet 2     Sig: Take 1 tablet by mouth daily    clopidogrel (PLAVIX) 75 MG tablet 90 tablet 2     Sig: Take 1 tablet by mouth daily     Pharmacy verified:    [address] -  [phone] -  [phone]     Date of last visit: 10/12/2023  Date of next visit (if applicable): 10/17/2024

## 2024-06-24 ENCOUNTER — TELEPHONE (OUTPATIENT)
Dept: CARDIOLOGY CLINIC | Age: 54
End: 2024-06-24

## 2024-06-24 NOTE — TELEPHONE ENCOUNTER
Pt called and she thinks she needs to have a tooth extracted, asking how long she needs to hold her plavix.   She hasn't seen a dentist yet.   I called her back and asked for her to reach out to her dentist and call us back to see what they plan to do.

## 2024-06-28 NOTE — TELEPHONE ENCOUNTER
Pre op Risk Assessment    Procedure Tooth extraction  Physician Swati  Date of surgery/procedure TBD    Last OV 10/12/23  Last Stress 5/1/18  Last Echo 3/26/21  Last Cath 2018  Last Stent 2018  Is patient on blood thinners ASA and Plavix  Hold Meds/how many days 5

## 2024-10-17 ENCOUNTER — OFFICE VISIT (OUTPATIENT)
Dept: CARDIOLOGY CLINIC | Age: 54
End: 2024-10-17
Payer: COMMERCIAL

## 2024-10-17 VITALS
SYSTOLIC BLOOD PRESSURE: 126 MMHG | WEIGHT: 207 LBS | BODY MASS INDEX: 32.49 KG/M2 | HEIGHT: 67 IN | DIASTOLIC BLOOD PRESSURE: 81 MMHG | HEART RATE: 75 BPM

## 2024-10-17 DIAGNOSIS — I25.10 CORONARY ARTERY DISEASE INVOLVING NATIVE CORONARY ARTERY OF NATIVE HEART WITHOUT ANGINA PECTORIS: Primary | ICD-10-CM

## 2024-10-17 PROCEDURE — 99214 OFFICE O/P EST MOD 30 MIN: CPT | Performed by: INTERNAL MEDICINE

## 2024-10-17 NOTE — PROGRESS NOTES
[facility] [place] [facility]  [facility] CARDIOLOGY  [address]  [address]  [facility] [address]  Dept: [phone]  Dept Fax: [phone]  Loc: [phone]    Visit Date: 10/17/2024    Ms. Bullock is a 54 y.o. female  who presented for:  Chief Complaint   Patient presents with    Follow-up       HPI:   53 yo F c hx of CAD s/p PCI, fibromyalgia, is here for a follow up. Echo in 3/201 showed normal LVSF EF 55%.  Denies any chest pain, sob, palpitations, lightheadedness, dizziness, orthopnea, PND or pedal edema.         Current Outpatient Medications:     atorvastatin (LIPITOR) 40 MG tablet, Take 1 tablet by mouth daily, Disp: 90 tablet, Rfl: 2    clopidogrel (PLAVIX) 75 MG tablet, Take 1 tablet by mouth daily, Disp: 90 tablet, Rfl: 2    famotidine (PEPCID) 20 MG tablet, Take 1 tablet by mouth daily, Disp: , Rfl:     albuterol sulfate HFA (PROVENTIL;VENTOLIN;PROAIR) 108 (90 Base) MCG/ACT inhaler, Inhale into the lungs as needed, Disp: , Rfl:     aspirin (ASPIRIN LOW DOSE) 81 MG EC tablet, take 1 tablet by mouth once daily, Disp: 90 tablet, Rfl: 3    nitroGLYCERIN (NITROSTAT) 0.4 MG SL tablet, up to max of 3 total doses. If no relief after 1 dose, call 911., Disp: 25 tablet, Rfl: 3    meclizine (ANTIVERT) 25 MG tablet, Take 1 tablet by mouth as needed (Patient not taking: Reported on 4/11/2024), Disp: , Rfl:     Past Medical History  Carolyn  has a past medical history of Arthritis, Bell's palsy, Cavernous angioma, Cellulitis, Fibromyalgia, GERD (gastroesophageal reflux disease), Migraine, Pneumonia, Pulmonary embolism, blood-clot, obstetric, postpartum condition, Stomach ulcer, and Unspecified diseases of blood and blood-forming organs.    Social History  Carolyn  reports that she quit smoking about 6 years ago. Her smoking use included cigarettes. She started smoking about 26 years ago. She has a 20 pack-year smoking history. She has never used smokeless tobacco. She reports that she does

## 2024-11-25 ENCOUNTER — TELEPHONE (OUTPATIENT)
Dept: CARDIOLOGY CLINIC | Age: 54
End: 2024-11-25

## 2025-01-03 RX ORDER — ATORVASTATIN CALCIUM 40 MG/1
40 TABLET, FILM COATED ORAL DAILY
Qty: 90 TABLET | Refills: 3 | Status: SHIPPED | OUTPATIENT
Start: 2025-01-03

## 2025-01-20 RX ORDER — CLOPIDOGREL BISULFATE 75 MG/1
75 TABLET ORAL DAILY
Qty: 90 TABLET | Refills: 3 | Status: SHIPPED | OUTPATIENT
Start: 2025-01-20

## 2025-02-12 ENCOUNTER — OFFICE VISIT (OUTPATIENT)
Dept: UROLOGY | Age: 55
End: 2025-02-12
Payer: COMMERCIAL

## 2025-02-12 VITALS — HEIGHT: 67 IN | BODY MASS INDEX: 33.74 KG/M2 | RESPIRATION RATE: 18 BRPM | WEIGHT: 215 LBS

## 2025-02-12 DIAGNOSIS — N39.0 FREQUENT UTI: Primary | ICD-10-CM

## 2025-02-12 DIAGNOSIS — Z85.528 PERSONAL HISTORY OF RENAL CELL CARCINOMA: ICD-10-CM

## 2025-02-12 DIAGNOSIS — R30.0 DYSURIA: ICD-10-CM

## 2025-02-12 LAB
BILIRUBIN, URINE: NEGATIVE
BLOOD URINE, POC: ABNORMAL
CHARACTER, URINE: CLEAR
COLOR, UA: YELLOW
GLUCOSE URINE: NEGATIVE MG/DL
KETONES, URINE: NEGATIVE
LEUKOCYTE CLUMPS, URINE: ABNORMAL
NITRITE, URINE: NEGATIVE
PH, URINE: 5 (ref 5–9)
PROTEIN, URINE: NEGATIVE MG/DL
SPECIFIC GRAVITY UA: 1.01 (ref 1–1.03)
UROBILINOGEN, URINE: 0.2 EU/DL (ref 0–1)

## 2025-02-12 PROCEDURE — 81003 URINALYSIS AUTO W/O SCOPE: CPT

## 2025-02-12 PROCEDURE — 99213 OFFICE O/P EST LOW 20 MIN: CPT

## 2025-02-12 NOTE — PROGRESS NOTES
[facility] [place] [facility]  [facility] UROLOGY  [address]  [address]  [facility] [address]  Dept: [phone]  Loc: [phone]  Visit Date: 2/12/2025    ARMIDA Bullock is a 54 y.o. female that presents to the urology clinic for history of renal cell carcinoma.    S/P Robotic Laparoscopic Right Radical Nephrectomy on 10/31/23 by Dr. Reid. Doing well post-operatively outside of isolated instance of RLQ/Flank pain following surgery- still present but coinciding with activity (MSK). Surveillance imaging negative.    No additional URO complaints.    Recurrent UTI  Increasing incidence over the past year. Currently being treated with Macrobid for active UTI.      Pathology Report (10/31/23)    Right Kidney, Nephrectomy    Renal cell carcinoma, clear-cell type.     Histologic grade: G2.     Tumor size: 2 cm.     Tumor extent: Limited to the kidney.     Margins: Negative for malignancy.     Pathologic stage: pT1a.       I independently reviewed the following reports from OSH:    CT ABDOMEN PELVIS WO CONTRAST  Result Date: 10/15/24      CHEST XR 2 VW  Result Date: 10/15/24          Last BUN and creatinine:  Lab Results   Component Value Date    BUN 18 01/07/2024     Lab Results   Component Value Date    CREATININE 1.3 01/07/2024           PAST MEDICAL, FAMILY AND SOCIAL HISTORY UPDATE:  Past Medical History:   Diagnosis Date    Arthritis     Bell's palsy     Cavernous angioma     Cellulitis     Fibromyalgia     GERD (gastroesophageal reflux disease)     Migraine     Pneumonia     Pulmonary embolism, blood-clot, obstetric, postpartum condition     Stomach ulcer     Unspecified diseases of blood and blood-forming organs     anemia     Past Surgical History:   Procedure Laterality Date    APPENDECTOMY      CHOLECYSTECTOMY      COLONOSCOPY      DILATION AND CURETTAGE OF UTERUS      EKG 12-LEAD  8/5/2015         ENDOSCOPY, COLON, DIAGNOSTIC      HYSTERECTOMY (CERVIX STATUS UNKNOWN)

## 2025-02-13 ENCOUNTER — TELEPHONE (OUTPATIENT)
Dept: UROLOGY | Age: 55
End: 2025-02-13

## 2025-02-13 LAB
BACTERIA UR CULT: ABNORMAL
ORGANISM: ABNORMAL

## 2025-02-13 NOTE — TELEPHONE ENCOUNTER
Patient scheduled for US RENAL COMP  at [facility] on 4/18/2025.  Arrival of 1030 for a 1045 scan time.  Order mailed with instructions to the patient NPO 6 HOURS PRIOR, FAT FREE MEAL THE EVENING PRIOR, NO CHEWING GUM THE DAY OF SCAN

## 2025-02-19 ENCOUNTER — LAB (OUTPATIENT)
Dept: UROLOGY | Age: 55
End: 2025-02-19

## 2025-02-19 DIAGNOSIS — N39.0 URINARY TRACT INFECTION WITHOUT HEMATURIA, SITE UNSPECIFIED: Primary | ICD-10-CM

## 2025-02-19 PROCEDURE — NBSRV NON-BILLABLE SERVICE

## 2025-02-21 DIAGNOSIS — B96.89 BACTERIAL VAGINOSIS: Primary | ICD-10-CM

## 2025-02-21 DIAGNOSIS — N76.0 BACTERIAL VAGINOSIS: Primary | ICD-10-CM

## 2025-02-21 RX ORDER — METRONIDAZOLE 500 MG/1
500 TABLET ORAL 2 TIMES DAILY
Qty: 14 TABLET | Refills: 0 | Status: SHIPPED | OUTPATIENT
Start: 2025-02-21 | End: 2025-02-28

## 2025-04-24 ENCOUNTER — TELEPHONE (OUTPATIENT)
Dept: ADMINISTRATIVE | Age: 55
End: 2025-04-24

## 2025-04-24 DIAGNOSIS — Z85.528 PERSONAL HISTORY OF RENAL CELL CARCINOMA: Primary | ICD-10-CM

## 2025-04-24 NOTE — TELEPHONE ENCOUNTER
Pt is calling and is wanting to see if a nurse can call her with the results from her scan.  Please advise pt at [phone]

## 2025-04-24 NOTE — TELEPHONE ENCOUNTER
Patient advised of the US results. Please schedule the ct scan in [facility]. Appointment scheduled.

## 2025-08-15 ENCOUNTER — TELEPHONE (OUTPATIENT)
Dept: CARDIOLOGY CLINIC | Age: 55
End: 2025-08-15

## 2025-08-27 ENCOUNTER — HOSPITAL ENCOUNTER (OUTPATIENT)
Dept: CT IMAGING | Age: 55
Discharge: HOME | End: 2025-08-27

## 2025-08-27 ENCOUNTER — TELEPHONE (OUTPATIENT)
Dept: UROLOGY | Age: 55
End: 2025-08-27

## 2025-08-27 DIAGNOSIS — Z00.6 EXAMINATION FOR NORMAL COMPARISON OR CONTROL IN CLINICAL RESEARCH: ICD-10-CM

## (undated) DEVICE — BLADELESS OBTURATOR: Brand: WECK VISTA

## (undated) DEVICE — 3M™ IOBAN™ 2 ANTIMICROBIAL INCISE DRAPE 6650EZ: Brand: IOBAN™ 2

## (undated) DEVICE — AGENT HEMSTAT W3XL4IN OXIDIZED REGENERATED CELOS ABSRB FOR

## (undated) DEVICE — GOWN,SIRUS,NONRNF,SETINSLV,XL,20/CS: Brand: MEDLINE

## (undated) DEVICE — TIP COVER ACCESSORY

## (undated) DEVICE — AIRSEAL 12 MM ACCESS PORT AND PALM GRIP OBTURATOR WITH BLADELESS OPTICAL TIP, 120 MM LENGTH: Brand: AIRSEAL

## (undated) DEVICE — Device

## (undated) DEVICE — GOWN,SIRUS,NON REINFRCD,LARGE,SET IN SL: Brand: MEDLINE

## (undated) DEVICE — PACK-MAJOR

## (undated) DEVICE — APPLICATOR LAP 35 CM 2 RIGID VISTASEAL

## (undated) DEVICE — ELECTRO LUBE IS A SINGLE PATIENT USE DEVICE THAT IS INTENDED TO BE USED ON ELECTROSURGICAL ELECTRODES TO REDUCE STICKING.: Brand: KEY SURGICAL ELECTRO LUBE

## (undated) DEVICE — SUTURE VCRL + SZ 0 L18IN ABSRB UD L36MM CT-1 1/2 CIR VCP840D

## (undated) DEVICE — SOLUTION ANTIFOG VIS SYS CLEARIFY LAPSCP

## (undated) DEVICE — CONTAINER,SPECIMEN,PNEU TUBE,4OZ,OR STRL: Brand: MEDLINE

## (undated) DEVICE — REDUCER: Brand: ENDOWRIST

## (undated) DEVICE — RELOAD STPL L60MM H1-2.6MM MESENTERY THN TISS WHT 6 ROW

## (undated) DEVICE — SEAL

## (undated) DEVICE — SUTURE MCRYL SZ 4-0 L27IN ABSRB UD L19MM PS-2 1/2 CIR PRIM Y426H

## (undated) DEVICE — GLOVE ORANGE PI 7 1/2   MSG9075

## (undated) DEVICE — COLUMN DRAPE

## (undated) DEVICE — SUTURE ABSORBABLE MONOFILAMENT 0 CTX 60 IN VIO PDS + PDP990G

## (undated) DEVICE — BLADE,CARBON-STEEL,15,STRL,DISPOSABLE,TB: Brand: MEDLINE

## (undated) DEVICE — SUTURE VCRL + SZ 0 L18IN ABSRB TIE VCP106G

## (undated) DEVICE — DRAPE,ROBOTICS,STERILE: Brand: MEDLINE

## (undated) DEVICE — PACK PROCEDURE SURG ROBOTIC KID SVMMC

## (undated) DEVICE — TROCAR: Brand: KII FIOS FIRST ENTRY

## (undated) DEVICE — APPLIER SUT CLP FOR 2-0 3-0 4-0 VCRL ABSRB SUT

## (undated) DEVICE — SUREFORM 45: Brand: SUREFORM

## (undated) DEVICE — TRI-LUMEN FILTERED TUBE SET WITH ACTIVATED CHARCOAL FILTER: Brand: AIRSEAL

## (undated) DEVICE — ARM DRAPE

## (undated) DEVICE — STAPLER 60MM POWERED ECHELON 3000 LONG 440MM

## (undated) DEVICE — SUREFORM 45 RELOAD GRAY: Brand: SUREFORM